# Patient Record
Sex: MALE
[De-identification: names, ages, dates, MRNs, and addresses within clinical notes are randomized per-mention and may not be internally consistent; named-entity substitution may affect disease eponyms.]

---

## 2020-07-15 ENCOUNTER — RX ONLY (OUTPATIENT)
Age: 65
Setting detail: RX ONLY
End: 2020-07-15

## 2020-07-16 ENCOUNTER — APPOINTMENT (RX ONLY)
Dept: URBAN - METROPOLITAN AREA CLINIC 77 | Facility: CLINIC | Age: 65
Setting detail: DERMATOLOGY
End: 2020-07-16

## 2020-07-16 DIAGNOSIS — R23.8 OTHER SKIN CHANGES: ICD-10-CM

## 2020-07-16 PROCEDURE — ? SCULPTRA

## 2020-07-16 NOTE — PROCEDURE: SCULPTRA
Additional Area 3 Volume In Cc: 0
Show Right And Left Mmc Volume?: No
Show Lateral Face Volume?: Yes
Temple Hollows Filler Volume In Cc: 1.3
Volumizer: Sculptra
Additional Area 1 Location: mid Supra orbital rim bilaterally, I/O, submalar, malar, pyriform triangles, anterior mandibles
Consent: Written consent obtained. Risks include but not limited to bruising, beading, irregular texture, ulceration, infection, allergic reaction, scar formation, incomplete augmentation, temporary nature, procedural pain.
Treatment Number: 7
Post-Care Instructions: Patient instructed to apply ice to reduce swelling. Patient to massage area for 5 minutes 5 times a day for 5 days.
Price (Use Numbers Only, No Special Characters Or $): 300.00
Reconstitution Date: 06/25/2020
Detail Level: Detailed
Expiration Date (Month Year): 10/31/2022
Injection Technique: The Sculptra was injected to the listed areas after cleansing the skin and providing appropriate anesthesia.
Dilution Method: The Sculptra was diluted with 6ml of bacteriostatic sterile water and 3.0 mL of 1% PLAIN Lidocaine without epinephrine for a total volume of 9mL for each vial.
Map Statement: See Attached Map for Complete Details.
Vials Reconstituted (Required For Inventory): 1
Lot #: [id number]

## 2021-01-05 ENCOUNTER — APPOINTMENT (RX ONLY)
Dept: URBAN - METROPOLITAN AREA CLINIC 77 | Facility: CLINIC | Age: 66
Setting detail: DERMATOLOGY
End: 2021-01-05

## 2021-01-05 DIAGNOSIS — R23.8 OTHER SKIN CHANGES: ICD-10-CM

## 2021-01-05 PROCEDURE — ? SCULPTRA

## 2021-01-05 NOTE — PROCEDURE: SCULPTRA
Show Topical Anesthesia?: Yes
Dorsal Hands Sculptra Filler Volume In Cc: 0
Show Right And Left Middle Malar Volume?: No
Treatment Number: 8
Price (Use Numbers Only, No Special Characters Or $): 400.00
Topical Anesthesia?: 20% lidocaine
Lot #: [id number]
Temple Hollows Filler Volume In Cc: 2
Injection Technique: The Sculptra was injected to the listed areas after cleansing the skin and providing appropriate anesthesia.
Additional Area 1 Location: mid Supra orbital rim bilaterally, I/O, submalar, malar, pyriform triangles, anterior mandibles
Volumizer: Sculptra
Map Statement: See Attached Map for Complete Details.
Dilution Method: The Sculptra was diluted with 6ml of bacteriostatic sterile water and 3.0 mL of 1% PLAIN Lidocaine without epinephrine for a total volume of 9mL for each vial.
Consent: Written consent obtained. Risks include but not limited to bruising, beading, irregular texture, ulceration, infection, allergic reaction, scar formation, incomplete augmentation, temporary nature, procedural pain.
Expiration Date (Month Year): 03/2023
Detail Level: Detailed
Vials Reconstituted (Required For Inventory): 1
Reconstitution Date: 01/4/21
Post-Care Instructions: Patient instructed to apply ice to reduce swelling. Patient to massage area for 5 minutes 5 times a day for 5 days.

## 2021-08-31 ENCOUNTER — RX ONLY (OUTPATIENT)
Age: 66
Setting detail: RX ONLY
End: 2021-08-31

## 2021-09-07 ENCOUNTER — APPOINTMENT (RX ONLY)
Dept: URBAN - METROPOLITAN AREA CLINIC 76 | Facility: CLINIC | Age: 66
Setting detail: DERMATOLOGY
End: 2021-09-07

## 2021-09-07 DIAGNOSIS — R23.8 OTHER SKIN CHANGES: ICD-10-CM

## 2021-09-07 PROCEDURE — ? SCULPTRA

## 2021-09-07 NOTE — PROCEDURE: SCULPTRA
Left Lateral Face Filler Volume In Cc: 0
Show Tear Troughs Volume?: Yes
Injection Technique: The Sculptra was injected to the listed areas after cleansing the skin and providing appropriate anesthesia.
Show Right And Left Tear Trough Volume?: No
Topical Anesthesia?: 20% lidocaine
Treatment Number: 9
Vials Reconstituted (Required For Inventory): 1
Lot #: [id number]
Price (Use Numbers Only, No Special Characters Or $): 300.00
Expiration Date (Month Year): 11/30/2022
Volumizer: Sculptra
Consent: Written consent obtained. Risks include but not limited to bruising, beading, irregular texture, ulceration, infection, allergic reaction, scar formation, incomplete augmentation, temporary nature, procedural pain.
Detail Level: Detailed
Dilution Method: The Sculptra was diluted with 6ml of bacteriostatic sterile water and 3.0 mL of 1% PLAIN Lidocaine without epinephrine for a total volume of 9mL for each vial.
Map Statement: See Attached Map for Complete Details.
Post-Care Instructions: Patient instructed to apply ice to reduce swelling. Patient to massage area for 5 minutes 5 times a day for 5 days.
Reconstitution Date: 08/31/2021

## 2022-03-08 ENCOUNTER — APPOINTMENT (RX ONLY)
Dept: URBAN - METROPOLITAN AREA CLINIC 76 | Facility: CLINIC | Age: 67
Setting detail: DERMATOLOGY
End: 2022-03-08

## 2022-03-08 DIAGNOSIS — R23.8 OTHER SKIN CHANGES: ICD-10-CM

## 2022-03-08 PROCEDURE — ? INVENTORY

## 2022-03-08 PROCEDURE — ? SCULPTRA

## 2022-03-08 NOTE — PROCEDURE: SCULPTRA
Left Lateral Face Filler Volume In Cc: 0
Show Tear Troughs Volume?: Yes
Injection Technique: The Sculptra was injected to the listed areas after cleansing the skin and providing appropriate anesthesia.
Show Right And Left Tear Trough Volume?: No
Topical Anesthesia?: 20% lidocaine
Treatment Number: 10
Vials Reconstituted (Required For Inventory): 1
Volumizer: Sculptra
Consent: Written consent obtained. Risks include but not limited to bruising, beading, irregular texture, ulceration, infection, allergic reaction, scar formation, incomplete augmentation, temporary nature, procedural pain.
Detail Level: Detailed
Dilution Method: The Sculptra was diluted with 6ml of bacteriostatic sterile water and 3.0 mL of 1% PLAIN Lidocaine without epinephrine for a total volume of 9mL for each vial.
Map Statement: See Attached Map for Complete Details.
Post-Care Instructions: Patient instructed to apply ice to reduce swelling. Patient to massage area for 5 minutes 5 times a day for 5 days.
Reconstitution Date: 2/24/2022

## 2022-08-04 ENCOUNTER — APPOINTMENT (RX ONLY)
Dept: URBAN - METROPOLITAN AREA CLINIC 76 | Facility: CLINIC | Age: 67
Setting detail: DERMATOLOGY
End: 2022-08-04

## 2022-08-04 DIAGNOSIS — R23.8 OTHER SKIN CHANGES: ICD-10-CM

## 2022-08-04 PROCEDURE — ? INVENTORY

## 2022-08-04 PROCEDURE — ? SCULPTRA

## 2023-05-17 ENCOUNTER — RX ONLY (OUTPATIENT)
Age: 68
Setting detail: RX ONLY
End: 2023-05-17

## 2023-05-23 ENCOUNTER — APPOINTMENT (RX ONLY)
Dept: URBAN - METROPOLITAN AREA CLINIC 76 | Facility: CLINIC | Age: 68
Setting detail: DERMATOLOGY
End: 2023-05-23

## 2023-05-23 DIAGNOSIS — R23.8 OTHER SKIN CHANGES: ICD-10-CM

## 2023-05-23 PROCEDURE — ? SCULPTRA

## 2023-05-23 PROCEDURE — ? INVENTORY

## 2023-05-23 NOTE — PROCEDURE: SCULPTRA
Left Lateral Face Filler Volume In Cc: 0
Show Tear Troughs Volume?: Yes
Injection Technique: The Sculptra was injected to the listed areas after cleansing the skin and providing appropriate anesthesia.
Show Right And Left Tear Trough Volume?: No
Topical Anesthesia?: 20% lidocaine
Treatment Number: 11
Vials Reconstituted (Required For Inventory): 1
Additional Area 1 Location: right temple
Volumizer: Sculptra
Anesthesia Type: 1% lidocaine without epinephrine
Consent: Written consent obtained. Risks include but not limited to bruising, beading, irregular texture, ulceration, infection, allergic reaction, scar formation, incomplete augmentation, temporary nature, procedural pain.
Detail Level: Detailed
Dilution Method: The Sculptra was diluted with 6ml of bacteriostatic sterile water and 3.0 mL of 1% PLAIN Lidocaine without epinephrine for a total volume of 9mL for each vial.
Map Statement: See Attached Map for Complete Details.
Post-Care Instructions: Patient instructed to apply ice to reduce swelling. Patient to massage area for 5 minutes 5 times a day for 5 days.
Reconstitution Date: 5/19/2023
Show Inventory Tab: Show

## 2023-12-05 ENCOUNTER — APPOINTMENT (RX ONLY)
Dept: URBAN - METROPOLITAN AREA CLINIC 76 | Facility: CLINIC | Age: 68
Setting detail: DERMATOLOGY
End: 2023-12-05

## 2023-12-05 DIAGNOSIS — R23.8 OTHER SKIN CHANGES: ICD-10-CM

## 2023-12-05 PROCEDURE — ? SCULPTRA

## 2023-12-05 PROCEDURE — ? ADDITIONAL NOTES

## 2023-12-05 PROCEDURE — ? INVENTORY

## 2023-12-05 NOTE — PROCEDURE: ADDITIONAL NOTES
Render Risk Assessment In Note?: no
Additional Notes: Mild volume loss on right temple that we have been treating compared to untreated side
Detail Level: Simple

## 2023-12-05 NOTE — PROCEDURE: SCULPTRA
Left Lateral Face Filler Volume In Cc: 0
Show Tear Troughs Volume?: Yes
Injection Technique: The Sculptra was injected to the listed areas after cleansing the skin and providing appropriate anesthesia.
Show Right And Left Tear Trough Volume?: No
Topical Anesthesia?: 20% lidocaine
Treatment Number: 11
Vials Reconstituted (Required For Inventory): 1
Additional Area 1 Location: right temple
Volumizer: Sculptra
Anesthesia Type: 1% lidocaine without epinephrine
Consent: Written consent obtained. Risks include but not limited to bruising, beading, irregular texture, ulceration, infection, allergic reaction, scar formation, incomplete augmentation, temporary nature, procedural pain.
Detail Level: Detailed
Dilution Method: The Sculptra was diluted with 6ml of bacteriostatic sterile water and 3.0 mL of 1% PLAIN Lidocaine without epinephrine for a total volume of 9mL for each vial.
Map Statement: See Attached Map for Complete Details.
Post-Care Instructions: Patient instructed to apply ice to reduce swelling. Patient to massage area for 5 minutes 5 times a day for 5 days.
Reconstitution Date: 12-1-23
Show Inventory Tab: Show

## 2023-12-07 ENCOUNTER — APPOINTMENT (RX ONLY)
Dept: URBAN - METROPOLITAN AREA CLINIC 76 | Facility: CLINIC | Age: 68
Setting detail: DERMATOLOGY
End: 2023-12-07

## 2023-12-07 DIAGNOSIS — R23.3 SPONTANEOUS ECCHYMOSES: ICD-10-CM

## 2023-12-07 PROCEDURE — ? VBEAM PERFECTA LASER

## 2023-12-07 NOTE — PROCEDURE: VBEAM PERFECTA LASER
Post-Procedure: Following the procedure the post care instructions were reviewed with the patient.
Pulse Duration: 6 ms
Number Of Pulses: 12
Spot Size: 3 mm
Detail Level: Zone
Location: FACE
Spray Time (Ms): 0
Is There A Fifth Setting?: no
Fluence (J/Cm2): 4
Device Serial Number (Optional): **NO CHARGE FOR TODAY**
Post-Care Instructions: I reviewed with the patient in detail post-care instructions.  Cool compresses or cold gel packs may be applied after treatment.  Exposure to the sun should be avoided and sun protection and sunscreen should be used.
Spot Size: 10 mm
Pre-Procedure: The treatment areas identified by the patient were cleansed and treatment was performed with the parameters mentioned above.\\n\\nAppropriate eye protection worn by all persons in the room. \\n\\nZimmer chiller used during procedure.
Consent: Written consent obtained.  Risks were reviewed including but not limited to crusting, scabbing, blistering, scarring, darker or lighter pigmentary change, bruising, and/or incomplete response.

## 2024-06-11 ENCOUNTER — APPOINTMENT (RX ONLY)
Dept: URBAN - METROPOLITAN AREA CLINIC 76 | Facility: CLINIC | Age: 69
Setting detail: DERMATOLOGY
End: 2024-06-11

## 2024-06-11 DIAGNOSIS — R23.8 OTHER SKIN CHANGES: ICD-10-CM

## 2024-06-11 DIAGNOSIS — L60.3 NAIL DYSTROPHY: ICD-10-CM

## 2024-06-11 PROCEDURE — ? SCULPTRA

## 2024-06-11 PROCEDURE — ? INVENTORY

## 2024-06-11 PROCEDURE — ? ADDITIONAL NOTES

## 2024-06-11 NOTE — PROCEDURE: SCULPTRA
Left Lateral Face Filler Volume In Cc: 0
Show Tear Troughs Volume?: Yes
Injection Technique: The Sculptra was injected to the listed areas after cleansing the skin and providing appropriate anesthesia.
Show Right And Left Tear Trough Volume?: No
Topical Anesthesia?: 20% lidocaine
Treatment Number: 12
Vials Reconstituted (Required For Inventory): 1
Additional Area 1 Location: right temple
Volumizer: Sculptra
Anesthesia Type: 1% lidocaine without epinephrine
Consent: Written consent obtained. Risks include but not limited to bruising, beading, irregular texture, ulceration, infection, allergic reaction, scar formation, incomplete augmentation, temporary nature, procedural pain.
Detail Level: Detailed
Dilution Method: The Sculptra was diluted with 6ml of bacteriostatic sterile water and 3.0 mL of 1% PLAIN Lidocaine without epinephrine for a total volume of 9mL for each vial.
Map Statement: See Attached Map for Complete Details.
Post-Care Instructions: Patient instructed to apply ice to reduce swelling. Patient to massage area for 5 minutes 5 times a day for 5 days.
Reconstitution Date: 12-1-23
Show Inventory Tab: Show

## 2024-06-11 NOTE — PROCEDURE: ADDITIONAL NOTES
Render Risk Assessment In Note?: no
Additional Notes: Mild volume loss on right temple that we have been treating compared to untreated side
Detail Level: Simple
Additional Notes: I counseled the patient regarding the following:\\nNail care: Keratolytic agents can smooth out dystrophic nails. Otherwise, treatment depends on the underlying cause.\\nExpectations: Nail Dystrophy may result from trauma, onychomycosis or inflammatory conditions. PAS stains may be helpful in distinguishing fungal infections from inflammatory causes.\\n3 years history of longitudinal dystrophy on nail nails.  Patient states he has been healthy.  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  Discussed with the patient on doing Rogaine topical on his nails\\n\\nNo charge for this discussion today

## 2025-02-13 ENCOUNTER — APPOINTMENT (OUTPATIENT)
Dept: URBAN - METROPOLITAN AREA CLINIC 76 | Facility: CLINIC | Age: 70
Setting detail: DERMATOLOGY
End: 2025-02-13

## 2025-02-13 DIAGNOSIS — R23.8 OTHER SKIN CHANGES: ICD-10-CM

## 2025-02-13 PROCEDURE — ? SCULPTRA

## 2025-02-13 PROCEDURE — ? ADDITIONAL NOTES

## 2025-02-13 PROCEDURE — ? INVENTORY

## 2025-02-13 NOTE — PROCEDURE: SCULPTRA
Left Lateral Face Filler Volume In Cc: 0
Show Tear Troughs Volume?: Yes
Injection Technique: The Sculptra was injected to the listed areas after cleansing the skin and providing appropriate anesthesia.
Show Right And Left Tear Trough Volume?: No
Topical Anesthesia?: 20% lidocaine
Treatment Number: 13
Vials Reconstituted (Required For Inventory): 1
Additional Area 1 Location: right temple
Volumizer: Sculptra
Anesthesia Type: 1% lidocaine without epinephrine
Consent: Written consent obtained. Risks include but not limited to bruising, beading, irregular texture, ulceration, infection, allergic reaction, scar formation, incomplete augmentation, temporary nature, procedural pain.
Detail Level: Detailed
Dilution Method: The Sculptra was diluted with 6ml of bacteriostatic sterile water and 3.0 mL of 1% PLAIN Lidocaine without epinephrine for a total volume of 9mL for each vial.
Map Statement: See Attached Map for Complete Details.
Post-Care Instructions: Patient instructed to apply ice to reduce swelling. Patient to massage area for 5 minutes 5 times a day for 5 days.
Reconstitution Date: 02/03/25
Show Inventory Tab: Show

## 2025-08-14 ENCOUNTER — APPOINTMENT (OUTPATIENT)
Dept: URBAN - METROPOLITAN AREA CLINIC 76 | Facility: CLINIC | Age: 70
Setting detail: DERMATOLOGY
End: 2025-08-14

## 2025-08-14 DIAGNOSIS — R23.8 OTHER SKIN CHANGES: ICD-10-CM

## 2025-08-14 PROCEDURE — ? SCULPTRA

## 2025-08-14 PROCEDURE — ? ADDITIONAL NOTES

## 2025-08-14 PROCEDURE — ? INVENTORY
